# Patient Record
Sex: MALE | Race: WHITE | Employment: FULL TIME | URBAN - NONMETROPOLITAN AREA
[De-identification: names, ages, dates, MRNs, and addresses within clinical notes are randomized per-mention and may not be internally consistent; named-entity substitution may affect disease eponyms.]

---

## 2020-08-31 ENCOUNTER — NURSE ONLY (OUTPATIENT)
Dept: FAMILY MEDICINE CLINIC | Age: 20
End: 2020-08-31

## 2020-08-31 ENCOUNTER — OFFICE VISIT (OUTPATIENT)
Dept: FAMILY MEDICINE CLINIC | Age: 20
End: 2020-08-31

## 2020-08-31 VITALS
TEMPERATURE: 97 F | OXYGEN SATURATION: 97 % | BODY MASS INDEX: 29.35 KG/M2 | SYSTOLIC BLOOD PRESSURE: 120 MMHG | HEIGHT: 70 IN | DIASTOLIC BLOOD PRESSURE: 78 MMHG | HEART RATE: 74 BPM | WEIGHT: 205 LBS

## 2020-08-31 DIAGNOSIS — Z20.822 COVID-19 RULED OUT BY CLINICAL CRITERIA: ICD-10-CM

## 2020-08-31 PROCEDURE — 99000 SPECIMEN HANDLING OFFICE-LAB: CPT | Performed by: NURSE PRACTITIONER

## 2020-08-31 PROCEDURE — 99202 OFFICE O/P NEW SF 15 MIN: CPT | Performed by: NURSE PRACTITIONER

## 2020-08-31 ASSESSMENT — PATIENT HEALTH QUESTIONNAIRE - PHQ9
1. LITTLE INTEREST OR PLEASURE IN DOING THINGS: 0
SUM OF ALL RESPONSES TO PHQ9 QUESTIONS 1 & 2: 0
SUM OF ALL RESPONSES TO PHQ QUESTIONS 1-9: 0
2. FEELING DOWN, DEPRESSED OR HOPELESS: 0
SUM OF ALL RESPONSES TO PHQ QUESTIONS 1-9: 0

## 2020-08-31 ASSESSMENT — ENCOUNTER SYMPTOMS
RHINORRHEA: 0
COUGH: 0
ABDOMINAL PAIN: 0
SHORTNESS OF BREATH: 0
NAUSEA: 0
WHEEZING: 0
DIARRHEA: 0
VOMITING: 0
CHEST TIGHTNESS: 0
SORE THROAT: 0

## 2020-08-31 NOTE — LETTER
[address], [facility] [address]  Phone: [phone]  Fax: [phone] BELLA Swift CNP    August 31, 2020     Patient: Shanta Sorto   Date of Visit: 8/31/2020       To Whom it May Concern:    Shanta Sorto was evaluated and tested today in our [facility]. It is my medical opinion that he should not return to work until COVID-19 test results are back. We expect results in 3-8 days. Thank you for your assistance in this matter. If you should have any questions, please have the patient contact our office.             Sincerely,        Electronically signed by BELLA Heart CNP on 8/31/2020 at 12:15 PM

## 2020-08-31 NOTE — PATIENT INSTRUCTIONS
We'll call with COVID-19 results  Results are also available on [employer] App    Things to Know:   - Do not leave home except to get medical care while waiting for your results  - Testing does not change treatment--> there is no medication that treats COVID-19  - Get plenty of rest and stay hydrated   - Over the counter pain/ fever reducers such as ibuprofen (aka Motrin/ Advil) or acetaminophen (aka Tylenol) per dosage instructions as needed  - Practice social distancing and wear a face mask when leaving home is necessary  - Continually monitor symptoms --> contact a medical provider if symptoms are worsening, such as difficulty breathing. Please be vigilant in self-monitoring for any signs or symptoms of illness  Symptoms may develop 2 days to 2 weeks following exposure to the virus. If you are exposed after testing, or if you were in the incubation period when tested, you could become ill with COVID-19 later. Keep a low threshold to go to ER or call 9-1-1 for new or suddenly worsening symptoms --> change in nature of cough, high fevers, trouble breathing, persistent vomiting, any other emergency warning signs or any symptoms that you think could be an emergency. Advance Care Planning  People with COVID-19 may have no symptoms, mild symptoms, such as fever, cough, and shortness of breath or they may have more severe illness, developing severe and fatal pneumonia. As a result, Advance Care Planning with attention to naming a health care decision maker (someone you trust to make healthcare decisions for you if you could not speak for yourself) and sharing other health care preferences is important BEFORE a possible health crisis. Please contact your Primary Care Provider to discuss Advance Care Planning.     Prevention steps for People with confirmed or suspected COVID-19 (including persons under investigation) who do not need to be hospitalized  and   People with confirmed COVID-19 who were hospitalized and determined to be medically stable to go home    Your healthcare provider and public health staff will evaluate whether you can be cared for at home. If it is determined that you do not need to be hospitalized and can be isolated at home, you will be monitored by staff from your local or state health department. You should follow the prevention steps below until a healthcare provider or local or state health department says you can return to your normal activities. Stay home except to get medical care  People who are mildly ill with COVID-19 are able to isolate at home during their illness. You should restrict activities outside your home, except for getting medical care. Do not go to work, school, or public areas. Avoid using public transportation, ride-sharing, or taxis. Separate yourself from other people and animals in your home  People: As much as possible, you should stay in a specific room and away from other people in your home. Also, you should use a separate bathroom, if available. Animals: You should restrict contact with pets and other animals while you are sick with COVID-19, just like you would around other people. Although there have not been reports of pets or other animals becoming sick with COVID-19, it is still recommended that people sick with COVID-19 limit contact with animals until more information is known about the virus. When possible, have another member of your household care for your animals while you are sick. If you are sick with COVID-19, avoid contact with your pet, including petting, snuggling, being kissed or licked, and sharing food. If you must care for your pet or be around animals while you are sick, wash your hands before and after you interact with pets and wear a facemask. Call ahead before visiting your doctor  If you have a medical appointment, call the healthcare provider and tell them that you have or may have COVID-19.  This will help the healthcare providers instructions for safe and effective use of the cleaning product including precautions you should take when applying the product, such as wearing gloves and making sure you have good ventilation during use of the product. Monitor your symptoms  Seek prompt medical attention if your illness is worsening (e.g., difficulty breathing). Before seeking care, call your healthcare provider and tell them that you have, or are being evaluated for, COVID-19. Put on a facemask before you enter the facility. These steps will help the healthcare providers office to keep other people in the office or waiting room from getting infected or exposed. Ask your healthcare provider to call the local or state health department. Persons who are placed under active monitoring or facilitated self-monitoring should follow instructions provided by their local health department or occupational health professionals, as appropriate. When working with your local health department check their available hours. If you have a medical emergency and need to call 911, notify the dispatch personnel that you have, or are being evaluated for COVID-19. If possible, put on a facemask before emergency medical services arrive. Discontinuing home isolation  Patients with confirmed COVID-19 should remain under home isolation precautions until the risk of secondary transmission to others is thought to be low. The decision to discontinue home isolation precautions should be made on a case-by-case basis, in consultation with healthcare providers and state and local health departments.

## 2020-08-31 NOTE — PROGRESS NOTES
8/31/2020    Subjective  Ros Sol, 23 y.o. male presents today with:  Chief Complaint   Patient presents with    Concern For COVID-19     pt states he had positive exposure through girlfriends roommate. HPI  COVID-19 Exposure:   Kimo Ross was self-referred to [facility]  w/ complaint of:close contact to someone w/ confirmed COVID-19. Girlfriend's college roommate tested positive for covid-19 8/30    Pt reports he is in his usual state of health today. Denies any signs/ symptoms of illness. Endorses a slight headache over the weekend--> resolved. Pertinent negatives: denies fever, cough, shortness of breath, wheezing, chest pain, myalgias, headache, rhinorrhea, sore throat, N/V/D. Denies anosmia/ dysgeusia. Relevant PMH: no pertinent PMH. History reviewed. No pertinent past medical history. History reviewed. No pertinent surgical history. Family History   Problem Relation Age of Onset    COPD Father     Heart Disease Father     Cancer Maternal Grandmother     Heart Attack Paternal Grandfather      Social History     Tobacco Use    Smoking status: Never Smoker    Smokeless tobacco: Never Used   Substance Use Topics    Alcohol use: Not on file    Drug use: Not on file     Travel screen completed: yes- no recent travel. Review of Systems   Constitutional: Negative for chills and fever. HENT: Negative for congestion, ear pain, rhinorrhea and sore throat. Respiratory: Negative for cough, chest tightness, shortness of breath and wheezing. Cardiovascular: Negative for chest pain and palpitations. Gastrointestinal: Negative for abdominal pain, diarrhea, nausea and vomiting. Musculoskeletal: Negative for myalgias. Skin: Negative for rash. Allergic/Immunologic: Negative for immunocompromised state. Neurological: Positive for headaches (resolved). Negative for dizziness and light-headedness.      Objective  Vitals:    08/31/20 1132   BP: 120/78   Pulse: 74   Temp: 97 °F (36.1 understanding and wishes to proceed with the plan. Discussed signs and symptoms which require immediate follow-up in ED/call to 911. Understanding verbalized. I have reviewed and updated the electronic medical record.     Jace Carroll, APRN - CNP

## 2020-09-09 LAB
SARS-COV-2: NOT DETECTED
SOURCE: NORMAL

## 2021-06-03 ENCOUNTER — HOSPITAL ENCOUNTER (OUTPATIENT)
Dept: CT IMAGING | Age: 21
Discharge: HOME | End: 2021-06-05
Payer: COMMERCIAL

## 2021-06-03 DIAGNOSIS — S82.831A CLOSED FRACTURE OF DISTAL END OF RIGHT FIBULA, UNSPECIFIED FRACTURE MORPHOLOGY, INITIAL ENCOUNTER: ICD-10-CM

## 2021-06-03 DIAGNOSIS — S82.54XA CLOSED NONDISPLACED FRACTURE OF MEDIAL MALLEOLUS OF RIGHT TIBIA, INITIAL ENCOUNTER: ICD-10-CM

## 2021-06-03 PROCEDURE — 73701 CT LOWER EXTREMITY W/DYE: CPT

## 2021-06-03 PROCEDURE — 6360000004 HC RX CONTRAST MEDICATION: Performed by: FAMILY MEDICINE

## 2021-06-03 RX ADMIN — IOPAMIDOL 100 ML: 755 INJECTION, SOLUTION INTRAVENOUS at 16:25
